# Patient Record
(demographics unavailable — no encounter records)

---

## 2024-11-25 NOTE — HISTORY OF PRESENT ILLNESS
[FreeTextEntry1] : This is a 45 y/o female with a PMHx of Breast CA, Atrial Septal Aneurysm, and Anemia coming in today for annual wellness exam. patient states that the last 2 months that she has bleed every time she has had a bowel movement.  States it's when she wipes, and she does not see it around the stool. She had a colonoscopy last December and she said that everything was okay at that time. patient states that she does have a history of hemorrhoids. patient states that she is also concerned about her weight. she states that every year she keeps gaining weight and can't loss it.  Patient denies fever, chills, weight loss, malaise, rash, recent travel, insect bites, alteration bowel habits, headaches, weakness, abdominal pain, bloating, changes in urination, visual disturbances, shortness of breath, chest pain, dizziness, palpitations.

## 2024-11-25 NOTE — PHYSICAL EXAM
[Well Developed] : well developed [Well Nourished] : well nourished [No Acute Distress] : no acute distress [Normal Conjunctiva] : normal conjunctiva [Normal Venous Pressure] : normal venous pressure [No Carotid Bruit] : no carotid bruit [Normal S1, S2] : normal S1, S2 [No Murmur] : no murmur [No Rub] : no rub [No Gallop] : no gallop [Clear Lung Fields] : clear lung fields [Good Air Entry] : good air entry [No Respiratory Distress] : no respiratory distress  [Soft] : abdomen soft [Non Tender] : non-tender [No Masses/organomegaly] : no masses/organomegaly [Normal Bowel Sounds] : normal bowel sounds [Normal Gait] : normal gait [No Edema] : no edema [No Cyanosis] : no cyanosis [No Clubbing] : no clubbing [No Varicosities] : no varicosities [No Rash] : no rash [No Skin Lesions] : no skin lesions [Moves all extremities] : moves all extremities [No Focal Deficits] : no focal deficits [Normal Speech] : normal speech [Alert and Oriented] : alert and oriented [Normal memory] : normal memory [de-identified] : external hemmorhoid  [de-identified] : nevi

## 2025-02-27 NOTE — PLAN
[FreeTextEntry1] : 46 year old female pt presents for routine gyn exam: Breast and pelvic exam performed Pap/HPV conducted Colonoscopy scheduled F/u w/ Internist for screening laboratories Nutrition and exercise discussed   L Breast ca, s/p left lumpectomy and radiation stage 1: Pt reports last breast MRi was 08/2024 C/w breast MRI every 6 mo Will send mammo, sono, and MRI report from [facility] H[facility] panel, referral sent for endocrinologist also for full workup  Fibroids and tamoxifen: Rto for pelvic sono   RTO for pelvic sono and in 1 year for annual or PRN

## 2025-02-27 NOTE — PHYSICAL EXAM

## 2025-02-27 NOTE — HISTORY OF PRESENT ILLNESS
[Patient reported colonoscopy was normal] : Patient reported colonoscopy was normal [FreeTextEntry1] : 2025 DOMINIC PAGAN 46 year old female  LMP 25 presents for annual visit.  She gets menses 4-5 times a year on Tamoxifen, not too heavy or painful. She denies BTB or abl vag d/c.  She states she had a hemorrhoid removed within the past year. It was a somewhat difficulty recovery. She reports that the fu biopsy came back wnl.   She denies abn discharge or vaginitis sxs. No urinary complaints. She has normal BM, no bloody stool. She denies abdominal or pelvic pain.   OBHx: : FT , 2009, 40w G2: , 2013, 36w GynHx: ovarian cyst, fibroid uterus. No Hx of STI, abnl paps or pelvic infections. PMH: Interatrial septal aneurysm, breast ca stage 1, anxiety, anemia, palpitations, murmur SHx: lumpectomy Meds: Tamoxifen All: NKDA Soc: No alcohol use. No T/D. Former smoker.  Psych: denies FHx: Father- bladder ca, Brother- prostate ca. Pt had negative genetic panel. Denies FHx of breast, ovarian, uterine, colon or pancreatic cancer. PHQ9= 2 [TextBox_4] : TVUS 03/2024: several fibroids, 3.4, 3.95, and 2.5 cm --stable sono [Mammogramdate] : 02/24 [TextBox_19] : done at [place], due 03/25 [BreastSonogramDate] : 02/24 [PapSmeardate] : 01/24 [TextBox_31] : NILM, HRHPV neg [ColonoscopyDate] : 12/24

## 2025-04-16 NOTE — PLAN
[FreeTextEntry1] : 47 yo female PMH breast ca on tamoxifen with AUB Endometrial Biopsy:  I will call pt with biopsy results in 5-7 days  recommend pt to have sonohysterogram  She is advised to call me if she experiences heavy vaginal bleeding, fever, chills or severe pain

## 2025-04-16 NOTE — HISTORY OF PRESENT ILLNESS
[FreeTextEntry1] : 45 yo perimenopausal female h/o breast ca on tamoxifen presents with prolonged vaginal bleeding x 3 weeks. reports light bleeding. no passage of clots or tissue Bleeding stopped 3 days ago  Menses noted to be irregular since starting tamoxifen. last 3 mos she's been getting it monthly  Pelvic Sono 3/24/25 axial uterus with fibroids. largest 4.33 on right lateral  EE 5.38 bilateral ovaries WNL- previous cysts resolved Cx unremarkable No FF

## 2025-04-16 NOTE — PROCEDURE
[Endometrial Biopsy] : Endometrial biopsy [Irregular Bleeding] : irregular uterine bleeding [Risks] : risks [Benefits] : benefits [Alternatives] : alternatives [Infection] : infection [Bleeding] : bleeding [Allergic Reaction] : allergic reaction [Uterine Perforation] : uterine perforation [Pain] : pain [Negative] : negative pregnancy test [No Premedication] : No premedication [None] : none [Tenaculum] : Tenaculum [Easy Passage] : Easy passage [Sounded to ___ cm] : sounded to [unfilled] ~Ucm [Moderate] : moderate [Specimen Collected] : collected [Sent to Pathology] : placed in buffered formalin and sent for pathology [Tolerated Well] : Patient tolerated the procedure well [No Complications] : No complications

## 2025-05-09 NOTE — REVIEW OF SYSTEMS
Addended by: TEJAS KENDRICK on: 9/13/2022 04:39 PM     Modules accepted: Orders     [Negative] : Heme/Lymph

## 2025-05-09 NOTE — HISTORY OF PRESENT ILLNESS
[FreeTextEntry1] : This is a 47 y/o female with a pmhx of  Breast CA, Atrial Septal Aneurysm, and Anemia coming in today for pre op.  Patient with prolonged vaginal bleeding, seen by GYN s/p TVUS 3/2025 Axial uterus visualized with fibroids. The largest one measuring 4.33 cm on right lateral. Endometrium thickness measuring 5.38 mm. Both ovaries appear normal. Previously seen 2.3 cm and 1.5 cm left simple cysts/follicle resolved. Cervix appears unremarkable. No Free Fluid seen. Biopsy 4/2025 showing Fragment with features suggestive of endometrial polyp. Patient now pending D&C with Dr. Shari Montemayor at [facility] [place].  Patient denies chest pain, dyspnea, palpitations, dizziness, syncope, changes in bowel/bladder habits or appetite.